# Patient Record
Sex: MALE | Employment: PART TIME | URBAN - NONMETROPOLITAN AREA
[De-identification: names, ages, dates, MRNs, and addresses within clinical notes are randomized per-mention and may not be internally consistent; named-entity substitution may affect disease eponyms.]

---

## 2019-10-03 ENCOUNTER — OFFICE VISIT (OUTPATIENT)
Dept: URGENT CARE | Facility: CLINIC | Age: 69
End: 2019-10-03
Payer: MEDICARE

## 2019-10-03 VITALS — WEIGHT: 220 LBS

## 2019-10-03 DIAGNOSIS — S91.209A NAIL AVULSION OF TOE, INITIAL ENCOUNTER: ICD-10-CM

## 2019-10-03 PROCEDURE — 99204 OFFICE O/P NEW MOD 45 MIN: CPT | Performed by: NURSE PRACTITIONER

## 2019-10-03 RX ORDER — GINSENG 100 MG
1 CAPSULE ORAL DAILY
Qty: 1 TUBE | Refills: 0 | Status: SHIPPED | OUTPATIENT
Start: 2019-10-03 | End: 2019-10-10

## 2019-10-03 RX ORDER — CEPHALEXIN 250 MG/1
250 CAPSULE ORAL 4 TIMES DAILY
Qty: 20 CAP | Refills: 0 | Status: SHIPPED | OUTPATIENT
Start: 2019-10-03 | End: 2019-10-08

## 2019-10-03 RX ORDER — ACETAMINOPHEN 325 MG/1
650 TABLET ORAL EVERY 4 HOURS PRN
Qty: 30 TAB | Refills: 0 | Status: SHIPPED | OUTPATIENT
Start: 2019-10-03

## 2019-10-03 RX ORDER — CEPHALEXIN 500 MG/1
500 CAPSULE ORAL 4 TIMES DAILY
Qty: 20 CAP | Refills: 0 | Status: SHIPPED | OUTPATIENT
Start: 2019-10-03 | End: 2019-10-03

## 2019-10-03 SDOH — HEALTH STABILITY: MENTAL HEALTH: HOW OFTEN DO YOU HAVE A DRINK CONTAINING ALCOHOL?: NEVER

## 2019-10-03 NOTE — PATIENT INSTRUCTIONS
-Daily dressing changes. Use a small  antibiotic ointment to place on the tissue, then covered with an adhesive bandage or gauze.  -Elevate foot, Ice (20 minutes on), and /or tylenol for pain.  -Monitor for signs of infection: (redness, pus, pain, increased swelling or fever), follow up urgently.   -Follow up with podiatry    Nail Bed Injury  The nail bed is the soft tissue under a fingernail or toenail that is the origin for new nail growth. Various types of injuries can occur at the nail bed. These injuries may involve bruising or bleeding under the nail, cuts (lacerations) in the nail or nail bed, or loss of a part of the nail or the whole nail (avulsion). In some cases, a nail bed injury accompanies another injury, such as a break (fracture) of the bone at the tip of the finger or toe. Nail bed injuries are common in people who have jobs that require performing manual tasks with their hands, such as  and landscapers.   The nail bed includes the growth center of the nail. If this growth center is damaged, the injured nail may not grow back normally if at all. The regrown nail might have an abnormal shape or appearance. It can take several months for a damaged or torn-off nail to regrow. Depending on the nature and extent of the nail bed injury, there may be a permanent disruption of normal nail growth.  CAUSES   Damage to the nail bed area is usually caused by crushing, pinching, cutting, or tearing injuries of the fingertip or toe. For example, these injuries may occur when a fingertip gets caught in a door, hit by a hammer, or damaged in accidents involving electrical tools or power machinery.   SYMPTOMS   Symptoms vary depending on the nature of the injury. Symptoms may include:  · Pain in the injured area.  · Bleeding.  · Swelling.  · Discoloration.  · Collection of blood under the nail (hematoma).  · Deformed or split nail.  · Loose nail (not stuck to the nail bed).  · Loss of all or part of the  nail.  DIAGNOSIS   Your caregiver will take a medical history and examine the injured area. You will be asked to describe how the injury occurred. X-rays may be done to see if you have a fracture. Your caregiver might also check for conditions that may affect healing, such as diabetes, nerve problems, or poor circulation.   TREATMENT   Treatment depends on the type of injury.  · The injury may not require any special treatment other than keeping the area clean and free of infection.    · Your caregiver may drain the collection of blood from under the nail. This can be done by making a small hole in the nail.    · Your caregiver may remove all or part of your nail. This might be necessary to stitch (suture) any laceration in the nail bed. Before doing this, the caregiver will likely give you medication to numb the nail area (local anesthetic). In some cases, the caregiver may choose to numb the entire finger or toe (digital nerve block). Depending on the location and size of the nail bed injury, an avulsed nail is sometimes stitched back in place to provide temporary protection to the nail bed until the new nail grows in.  · Your caregiver may apply bandages (dressings) or splints to the area.  · You might be prescribed antibiotic medication to help prevent infection.  · For certain injuries, your caregiver may direct you to see a hand or foot specialist.    You may need a tetanus shot if:   · You cannot remember when you had your last tetanus shot.  · You have never had a tetanus shot.  · The injury broke your skin.  If you get a tetanus shot, your arm may swell, get red, and feel warm to the touch. This is common and not a problem. If you need a tetanus shot and you choose not to have one, there is a rare chance of getting tetanus. Sickness from tetanus can be serious.  HOME CARE INSTRUCTIONS   · Keep your hand or foot raised (elevated) to relieve pain and swelling.    ¨ For an injured toenail, lie in bed or on a  couch with your leg on pillows. You can also sit in a recliner with your leg up. Avoid walking or letting your leg dangle. When you walk, wear an open-toe shoe.   ¨ For an injured fingernail, keep your hand above the level of your heart. Use pillows on a table or on the arm of your chair while sitting. Use them on your bed while sleeping.    · Keep your injury protected with dressings or splints as directed by your caregiver.    · Keep any dressings clean and dry. Change or remove your dressings as directed by your caregiver.    · Only take over-the-counter or prescription medications as directed by your caregiver. If you were prescribed antibiotics, take them as directed. Finish them even if you start to feel better.    · Follow up with your caregiver as directed.    SEEK MEDICAL CARE IF:   · You have pain that is not controlled with medication.    · You have any problems caring for your injury.    SEEK IMMEDIATE MEDICAL CARE IF:   · You have increased pain, drainage, or bleeding in the injured area.    · You have redness, soreness, and swelling (inflammation) in the injured area.  · You have a fever or persistent symptoms for more than 2-3 days.  · You have a fever and your symptoms suddenly get worse.  · You have swelling that spreads from your finger into your hand or from your toe into your foot.    MAKE SURE YOU:  · Understand these instructions.  · Will watch your condition.  · Will get help right away if you are not doing well or get worse.  This information is not intended to replace advice given to you by your health care provider. Make sure you discuss any questions you have with your health care provider.  Document Released: 01/25/2006 Document Revised: 04/14/2014 Document Reviewed: 01/09/2014  [employer] Interactive Patient Education © 2017 [employer]

## 2019-10-03 NOTE — PROGRESS NOTES
Subjective:     Rupert Casanova is a 69 y.o. male who presents for Nail Problem (left big toe)      HPI    History reviewed. No pertinent past medical history.    History reviewed. No pertinent surgical history.    Social History     Socioeconomic History   • Marital status: Single     Spouse name: Not on file   • Number of children: Not on file   • Years of education: Not on file   • Highest education level: Not on file   Occupational History   • Not on file   Social Needs   • Financial resource strain: Not on file   • Food insecurity:     Worry: Not on file     Inability: Not on file   • Transportation needs:     Medical: Not on file     Non-medical: Not on file   Tobacco Use   • Smoking status: Never Smoker   • Smokeless tobacco: Never Used   Substance and Sexual Activity   • Alcohol use: Never     Frequency: Never   • Drug use: Never   • Sexual activity: Not on file   Lifestyle   • Physical activity:     Days per week: Not on file     Minutes per session: Not on file   • Stress: Not on file   Relationships   • Social connections:     Talks on phone: Not on file     Gets together: Not on file     Attends Orthodoxy service: Not on file     Active member of club or organization: Not on file     Attends meetings of clubs or organizations: Not on file     Relationship status: Not on file   • Intimate partner violence:     Fear of current or ex partner: Not on file     Emotionally abused: Not on file     Physically abused: Not on file     Forced sexual activity: Not on file   Other Topics Concern   • Not on file   Social History Narrative   • Not on file        History reviewed. No pertinent family history.     Allergies   Allergen Reactions   • Contrast Media With Iodine [Iodine]        ROS     Objective:   Wt 99.8 kg (220 lb)     Physical Exam    Assessment/Plan:   There are no diagnoses linked to this encounter.  Three-sided toe block    Differential diagnosis, natural history, supportive care, and indications for  immediate follow-up discussed.

## 2019-10-03 NOTE — PROGRESS NOTES
Subjective:     Rupert Casanova is a 69 y.o. male who presents for Nail Problem (left big toe)      Patient reports that last night the toenail on his left big toe lifted up. Unknown mechanism of injury. Denies pain. No fevers. No active bleeding, toe is dressed. Is a Diabetic with neuropathy, and has decreased sensation in foot. History of diabetic foot ulcers, has a Podiatry appointment on 10/9. Takes eliquis. History of renal insufficiency. GFR 38 on 9/3.        Foot Problem   This is a new problem. The current episode started yesterday. The problem has been unchanged. Pertinent negatives include no chills, fever or rash.       Past Medical History:   Diagnosis Date   • Diabetic neuropathy (HCC)        History reviewed. No pertinent surgical history.    Social History     Socioeconomic History   • Marital status: Single     Spouse name: Not on file   • Number of children: Not on file   • Years of education: Not on file   • Highest education level: Not on file   Occupational History   • Not on file   Social Needs   • Financial resource strain: Not on file   • Food insecurity:     Worry: Not on file     Inability: Not on file   • Transportation needs:     Medical: Not on file     Non-medical: Not on file   Tobacco Use   • Smoking status: Never Smoker   • Smokeless tobacco: Never Used   Substance and Sexual Activity   • Alcohol use: Never     Frequency: Never   • Drug use: Never   • Sexual activity: Not on file   Lifestyle   • Physical activity:     Days per week: Not on file     Minutes per session: Not on file   • Stress: Not on file   Relationships   • Social connections:     Talks on phone: Not on file     Gets together: Not on file     Attends [employer] service: Not on file     Active member of club or organization: Not on file     Attends meetings of clubs or organizations: Not on file     Relationship status: Not on file   • Intimate partner violence:     Fear of current or ex partner: Not on file      Emotionally abused: Not on file     Physically abused: Not on file     Forced sexual activity: Not on file   Other Topics Concern   • Not on file   Social History Narrative   • Not on file        History reviewed. No pertinent family history.     Allergies   Allergen Reactions   • Contrast Media With Iodine [Iodine]        Review of Systems   Constitutional: Negative for chills and fever.   Musculoskeletal: Negative for falls and joint pain.   Skin: Negative for rash.        No active bleeding.   Neurological:        No new sensory changes.    All other systems reviewed and are negative.       Objective:   Wt 99.8 kg (220 lb)     Physical Exam   Constitutional: He is oriented to person, place, and time. He appears well-developed. No distress.   HENT:   Head: Normocephalic.   Right Ear: External ear normal.   Left Ear: External ear normal.   Nose: Nose normal.   Mouth/Throat: Oropharynx is clear and moist.   Eyes: Conjunctivae are normal.   Neck: Normal range of motion.   Cardiovascular: Normal rate.   Pulses:       Dorsalis pedis pulses are 2+ on the left side.        Posterior tibial pulses are 2+ on the left side.   Pulmonary/Chest: Effort normal.   Musculoskeletal: Normal range of motion. He exhibits tenderness. He exhibits no edema.        Left foot: There is tenderness. There is normal range of motion and no swelling.   Left great toe nail lifted up, still attached to lateral nail bed, some bleeding to matrix.   Feet:   Left Foot:   Skin Integrity: Negative for erythema.   Neurological: He is alert and oriented to person, place, and time. He has normal strength.   Skin: Skin is warm and dry.   Psychiatric: He has a normal mood and affect. His behavior is normal. Judgment and thought content normal.   Vitals reviewed.      Assessment/Plan:   1. Nail avulsion of toe, initial encounter  - acetaminophen (TYLENOL) 325 MG Tab; Take 2 Tabs by mouth every four hours as needed for Moderate Pain.  Dispense: 30 Tab;  Refill: 0  - bacitracin 500 UNIT/GM ointment; Apply 1 Each to affected area(s) every day for 7 days.  Dispense: 1 Tube; Refill: 0  - cephALEXin (KEFLEX) 250 MG Cap; Take 1 Cap by mouth 4 times a day for 5 days.  Dispense: 20 Cap; Refill: 0  (CrCl 30 to 59 mL/minute: There are no specific dosage adjustments provided in the ’s labeling; maximum recommended daily dose: 1,000 mg/day.)    -Procedure: 3 sided digit block, nail trimmed back, still attached to lateral nail bed, some bleeding to matrix, pressure applied and bleeding stopped.     -Daily dressing changes. Use a small  antibiotic ointment to place on the tissue, then covered with an adhesive bandage or gauze.  -Elevate foot, Ice (20 minutes on), and /or tylenol for pain.  -Monitor for signs of infection: (redness, pus, pain, increased swelling or fever), follow up urgently.   -Follow up with podiatry.     The wound was soaked and cleansed, and dressed. Home wound care instructions are provided. Tetanus vaccination status reviewed: last tetanus booster within 10 years (2016).    Differential diagnosis, natural history, supportive care, and indications for immediate follow-up discussed.    -[place] ATTN Desiree, fax [phone]

## 2019-10-04 ASSESSMENT — ENCOUNTER SYMPTOMS
FEVER: 0
ROS SKIN COMMENTS: NO ACTIVE BLEEDING.
CHILLS: 0
FALLS: 0